# Patient Record
Sex: MALE | Race: WHITE | NOT HISPANIC OR LATINO | Employment: FULL TIME | URBAN - METROPOLITAN AREA
[De-identification: names, ages, dates, MRNs, and addresses within clinical notes are randomized per-mention and may not be internally consistent; named-entity substitution may affect disease eponyms.]

---

## 2018-09-20 ENCOUNTER — HISTORICAL (OUTPATIENT)
Dept: ADMINISTRATIVE | Facility: HOSPITAL | Age: 33
End: 2018-09-20

## 2019-02-14 ENCOUNTER — HISTORICAL (OUTPATIENT)
Dept: ADMINISTRATIVE | Facility: HOSPITAL | Age: 34
End: 2019-02-14

## 2019-02-14 LAB
ABS NEUT (OLG): 2.9 X10(3)/MCL (ref 2.1–9.2)
ALBUMIN SERPL-MCNC: 4.4 GM/DL (ref 3.4–5)
ALBUMIN/GLOB SERPL: 1.76 {RATIO} (ref 1.5–2.5)
ALP SERPL-CCNC: 52 UNIT/L (ref 38–126)
ALT SERPL-CCNC: 18 UNIT/L (ref 7–52)
AST SERPL-CCNC: 19 UNIT/L (ref 15–37)
BILIRUB SERPL-MCNC: 0.9 MG/DL (ref 0.2–1)
BILIRUBIN DIRECT+TOT PNL SERPL-MCNC: 0.2 MG/DL (ref 0–0.5)
BILIRUBIN DIRECT+TOT PNL SERPL-MCNC: 0.7 MG/DL
BUN SERPL-MCNC: 10 MG/DL (ref 7–18)
CALCIUM SERPL-MCNC: 8.9 MG/DL (ref 8.5–10)
CHLORIDE SERPL-SCNC: 102 MMOL/L (ref 98–107)
CHOLEST SERPL-MCNC: 193 MG/DL (ref 0–200)
CHOLEST/HDLC SERPL: 3.6 {RATIO}
CO2 SERPL-SCNC: 28 MMOL/L (ref 21–32)
CREAT SERPL-MCNC: 0.87 MG/DL (ref 0.6–1.3)
ERYTHROCYTE [DISTWIDTH] IN BLOOD BY AUTOMATED COUNT: 12.7 % (ref 11.5–17)
GLOBULIN SER-MCNC: 2.5 GM/DL (ref 1.2–3)
GLUCOSE SERPL-MCNC: 89 MG/DL (ref 74–106)
HCT VFR BLD AUTO: 47 % (ref 42–52)
HDLC SERPL-MCNC: 53 MG/DL (ref 35–60)
HGB BLD-MCNC: 14.9 GM/DL (ref 14–18)
LDLC SERPL CALC-MCNC: 109 MG/DL (ref 0–129)
LYMPHOCYTES # BLD AUTO: 2 X10(3)/MCL (ref 0.6–3.4)
LYMPHOCYTES NFR BLD AUTO: 35.8 % (ref 13–40)
MCH RBC QN AUTO: 30 PG (ref 27–31.2)
MCHC RBC AUTO-ENTMCNC: 32 GM/DL (ref 32–36)
MCV RBC AUTO: 95 FL (ref 80–94)
MONOCYTES # BLD AUTO: 0.6 X10(3)/MCL (ref 0.1–1.3)
MONOCYTES NFR BLD AUTO: 11 % (ref 0.1–24)
NEUTROPHILS NFR BLD AUTO: 53.2 % (ref 47–80)
PLATELET # BLD AUTO: 193 X10(3)/MCL (ref 130–400)
PMV BLD AUTO: 10.8 FL (ref 9.4–12.4)
POTASSIUM SERPL-SCNC: 3.9 MMOL/L (ref 3.5–5.1)
PROT SERPL-MCNC: 6.9 GM/DL (ref 6.4–8.2)
RBC # BLD AUTO: 4.97 X10(6)/MCL (ref 4.7–6.1)
SODIUM SERPL-SCNC: 137 MMOL/L (ref 136–145)
TRIGL SERPL-MCNC: 97 MG/DL (ref 30–150)
TSH SERPL-ACNC: 0.29 MIU/ML (ref 0.35–4.94)
VLDLC SERPL CALC-MCNC: 19.4 MG/DL
WBC # SPEC AUTO: 5.5 X10(3)/MCL (ref 4.5–11.5)

## 2019-02-19 ENCOUNTER — HISTORICAL (OUTPATIENT)
Dept: CARDIOLOGY | Facility: HOSPITAL | Age: 34
End: 2019-02-19

## 2019-05-03 ENCOUNTER — HISTORICAL (OUTPATIENT)
Dept: CARDIOLOGY | Facility: HOSPITAL | Age: 34
End: 2019-05-03

## 2020-03-05 ENCOUNTER — HISTORICAL (OUTPATIENT)
Dept: ADMINISTRATIVE | Facility: HOSPITAL | Age: 35
End: 2020-03-05

## 2020-03-05 LAB — POC CREATININE: 0.9 MG/DL (ref 0.6–1.3)

## 2021-08-02 ENCOUNTER — HOSPITAL ENCOUNTER (OUTPATIENT)
Dept: OCCUPATIONAL THERAPY | Facility: HOSPITAL | Age: 36
End: 2021-08-03
Attending: INTERNAL MEDICINE | Admitting: INTERNAL MEDICINE

## 2021-08-02 LAB
ABS NEUT (OLG): 2.06 X10(3)/MCL (ref 2.1–9.2)
ALBUMIN SERPL-MCNC: 4.5 GM/DL (ref 3.5–5)
ALBUMIN/GLOB SERPL: 1.2 RATIO (ref 1.1–2)
ALP SERPL-CCNC: 70 UNIT/L (ref 40–150)
ALT SERPL-CCNC: 19 UNIT/L (ref 0–55)
APTT PPP: 26.7 SEC (ref 23.4–34.9)
AST SERPL-CCNC: 22 UNIT/L (ref 5–34)
BASOPHILS NFR BLD MANUAL: 0 % (ref 0–2)
BILIRUB SERPL-MCNC: 1 MG/DL (ref 0.2–1.2)
BILIRUBIN DIRECT+TOT PNL SERPL-MCNC: 0.4 MG/DL (ref 0–0.5)
BILIRUBIN DIRECT+TOT PNL SERPL-MCNC: 0.6 MG/DL (ref 0–0.8)
BUN SERPL-MCNC: 6.7 MG/DL (ref 8.9–20.6)
CALCIUM SERPL-MCNC: 9.7 MG/DL (ref 8.4–10.2)
CHLORIDE SERPL-SCNC: 105 MMOL/L (ref 98–107)
CO2 SERPL-SCNC: 23 MMOL/L (ref 22–29)
CREAT SERPL-MCNC: 1.01 MG/DL (ref 0.72–1.25)
EOSINOPHIL NFR BLD MANUAL: 3 % (ref 0–8)
ERYTHROCYTE [DISTWIDTH] IN BLOOD BY AUTOMATED COUNT: 12.2 % (ref 11.5–17)
GLOBULIN SER-MCNC: 3.6 GM/DL (ref 2.4–3.5)
GLUCOSE SERPL-MCNC: 98 MG/DL (ref 74–100)
GRANULOCYTES NFR BLD MANUAL: 44 % (ref 47–80)
HCT VFR BLD AUTO: 45.9 % (ref 42–52)
HGB BLD-MCNC: 15.4 GM/DL (ref 14–18)
INR PPP: 1 (ref 2–3)
LYMPHOCYTES NFR BLD MANUAL: 3 %
LYMPHOCYTES NFR BLD MANUAL: 37 % (ref 13–40)
MCH RBC QN AUTO: 30.7 PG (ref 27–31)
MCHC RBC AUTO-ENTMCNC: 33.6 GM/DL (ref 33–36)
MCV RBC AUTO: 91.4 FL (ref 80–94)
MONOCYTES NFR BLD MANUAL: 13 % (ref 2–11)
PLATELET # BLD AUTO: 254 X10(3)/MCL (ref 130–400)
PLATELET # BLD EST: ADEQUATE 10*3/UL
PMV BLD AUTO: 11.3 FL (ref 7.4–10.4)
POTASSIUM SERPL-SCNC: 3.4 MMOL/L (ref 3.5–5.1)
PROT SERPL-MCNC: 8.1 GM/DL (ref 6.4–8.3)
PROTHROMBIN TIME: 13.2 SEC (ref 11.7–14.5)
RBC # BLD AUTO: 5.02 X10(6)/MCL (ref 4.7–6.1)
RBC MORPH BLD: NORMAL
SARS-COV-2 AG RESP QL IA.RAPID: NEGATIVE
SODIUM SERPL-SCNC: 140 MMOL/L (ref 136–145)
TROPONIN I SERPL-MCNC: <0.01 NG/ML (ref 0.01–0.03)
WBC # SPEC AUTO: 5.9 X10(3)/MCL (ref 4.5–11.5)

## 2021-08-03 LAB
ABS NEUT (OLG): 11.63 X10(3)/MCL (ref 2.1–9.2)
BASOPHILS # BLD AUTO: 0 X10(3)/MCL (ref 0–0.2)
BASOPHILS NFR BLD AUTO: 0 %
BUN SERPL-MCNC: 14 MG/DL (ref 8.9–20.6)
CALCIUM SERPL-MCNC: 9.1 MG/DL (ref 8.4–10.2)
CHLORIDE SERPL-SCNC: 106 MMOL/L (ref 98–107)
CO2 SERPL-SCNC: 22 MMOL/L (ref 22–29)
CREAT SERPL-MCNC: 0.79 MG/DL (ref 0.73–1.18)
CREAT/UREA NIT SERPL: 18
EOSINOPHIL # BLD AUTO: 0 X10(3)/MCL (ref 0–0.9)
EOSINOPHIL NFR BLD AUTO: 0 %
ERYTHROCYTE [DISTWIDTH] IN BLOOD BY AUTOMATED COUNT: 12.4 % (ref 11.5–17)
GLUCOSE SERPL-MCNC: 140 MG/DL (ref 74–100)
HCT VFR BLD AUTO: 41.5 % (ref 42–52)
HGB BLD-MCNC: 14.1 GM/DL (ref 14–18)
LYMPHOCYTES # BLD AUTO: 0.8 X10(3)/MCL (ref 0.6–4.6)
LYMPHOCYTES NFR BLD AUTO: 6 %
MCH RBC QN AUTO: 30.8 PG (ref 27–31)
MCHC RBC AUTO-ENTMCNC: 34 GM/DL (ref 33–36)
MCV RBC AUTO: 90.6 FL (ref 80–94)
MONOCYTES # BLD AUTO: 0.8 X10(3)/MCL (ref 0.1–1.3)
MONOCYTES NFR BLD AUTO: 6 %
NEUTROPHILS # BLD AUTO: 11.63 X10(3)/MCL (ref 2.1–9.2)
NEUTROPHILS NFR BLD AUTO: 87 %
PLATELET # BLD AUTO: 237 X10(3)/MCL (ref 130–400)
PMV BLD AUTO: 12.3 FL (ref 9.4–12.4)
POTASSIUM SERPL-SCNC: 4.4 MMOL/L (ref 3.5–5.1)
RBC # BLD AUTO: 4.58 X10(6)/MCL (ref 4.7–6.1)
SODIUM SERPL-SCNC: 137 MMOL/L (ref 136–145)
WBC # SPEC AUTO: 13.4 X10(3)/MCL (ref 4.5–11.5)

## 2022-04-29 NOTE — H&P
Patient:   Rich Nicholas             MRN: [id number]            FIN: [id number]               Age:   36 years     Sex:  Male     :  1985   Associated Diagnoses:   None   Author:   José Gutierrez MD      Basic Information   Source of history:  Self.    Referral source:  Emergency department.    History limitation:  None.    PCP: Dr. Wilburn      Chief Complaint   2021 5:30 CDT        hx of spontaneous pneumonthorax / now having chest pain l sided        History of Present Illness   This is a 35 y/o Male with a hx of ADHD and a remote hx of a spontaneous pneumothorax x2 in . This am he develop sharp, left side pleuritic pain that worsened over 45 mins. He presented to the ER for evaluation and was found to have a new left sided pneumothorax. He denies any recent trauma,  respiratory illnesses or surgical interventions. His pain is controlled at the time of my evaluation. He has no other c/o at the time of my evaluation.      Review of Systems   Except as documented, all other systems reviewed and negative.      Health Status   Allergies:    Allergic Reactions (Selected)  Severity Not Documented  Cephalosporins- Generalized anaphylaxis.  Influenza virus vaccine, inactivated- Unknown.  Morphine- Hives.  Tuberculin purified protein derivative- No reactions were documented.,    Allergies (4) Active Reaction  cephalosporins Generalized anaphylaxis  influenza virus vaccine, Unknown  inactivated  morphine Hives  tuberculin purified protein None Documented  derivative     Current medications:  (Selected)   Inpatient Medications  Ordered  morphine 4 mg/mL preservative-free intravenous solution: 4 mg, form: Injection, IV Push, q4hr PRN for pain, severe, Order duration: 3 day(s), first dose 21 9:43:00 CDT, stop date 21 9:42:00 CDT, STAT, ( > 7 on pain scale)  Pending Complete  Dilaudid 2 mg/mL injectable solution: 0.2 mg, form: Injection, IV Slow, q5min, Order duration: 5 dose(s), first dose  02/18/14 14:30:00 CST, stop date 02/18/14 14:54:00 CST, over at least 2 3 minutes  Prescriptions  Prescribed  Neurontin 300 mg oral capsule: 300 mg = 1 cap(s), Oral, TID, # 90 cap(s), 1 Refill(s), Pharmacy: [facility], 186, cm, Height/Length Dosing, 02/24/21 14:13:00 CST, 77.6, kg, Weight Dosing, 02/24/21 14:10:00 CST  ProAir HFA 90 mcg/inh inhalation aerosol with adapter: 90 mcg = 1 puff(s), INH, q4hr, PRN PRN as needed for wheezing, # 1 EA, 3 Refill(s), Pharmacy: [facility], 186, cm, Height/Length Dosing, 02/24/21 14:13:00 CST, 77.6, kg, Weight Dosing, 02/24/21 14:10:00 CST  Vyvanse 60 mg oral capsule: 60 mg = 1 cap(s), Oral, qAM, # 30 cap(s), 0 Refill(s)  albuterol 2.5 mg/3 mL (0.083%) inhalation solution: 2.5 mg = 3 mL, INH, q6hr, PRN PRN for wheezing, # 100 EA, 3 Refill(s), Pharmacy: [facility], 186, cm, Height/Length Dosing, 11/18/20 14:50:00 CST, 75.4, kg, Weight Dosing, 11/18/20 14:48:00 CST,    Medications (1) Active  Scheduled: (0)  Continuous: (0)  PRN: (1)  morphine 4 mg/mL preservative-free Soln  4 mg 1 mL, IV Push, q4hr     Problem list:    All Problems  Asthma / SNOMED CT 548655842 / Confirmed  Allergy to food / SNOMED CT 0009758540 / Confirmed  ADHD / SNOMED CT 6904995259 / Confirmed  TOS - Thoracic outlet syndrome / SNOMED CT 015565562 / Confirmed  Renal calculus / SNOMED CT 105865498 / Confirmed  Resolved: Impaired mobility / SNOMED CT 055418879  Problem automatically updated based on documentation on Assistive Device, ADLs, Activity Status ADLs, Musculoskeletal Range of Motion, Musculoskeletal Activity, Special Orthopedic Devices, and/or Traction Type.  Resolved: Alteration in patterns of urinary elimination / SNOMED CT 85594793  Problem automatically updated based on documentation on Genitourinary Symptoms and/or Urinary Elimination.  Resolved: Impaired skin integrity / SNOMED CT 91215730  Problem automatically updated based on documentation on Skin Abnormality Type and/or  Pressure Ulcer Present on Admisison.  Resolved: At risk for impaired skin integrity / SNOMED CT 840018163  Problem automatically updated based on documentation on Skin Abnormality Type, Pressure Ulcer Present on Admission, Oxygen Therapy, Mask/Delivery Type, Pressure Point, and/or a Ron Score of less than 16.  Resolved: At risk for infection / SNOMED CT 716669751  Problem automatically updated based on documentation on Surgical Drain Tube Type and/or Skin Abnormality Type.  Resolved: Pneumothorax / SNOMED CT [id number]  x2  Canceled: No Chronic Problems / Cerner NKP  Canceled: ADD - Attention deficit disorder / SNOMED CT 155970613,    Active Problems (5)  ADHD   Allergy to food   Asthma   Renal calculus   TOS - Thoracic outlet syndrome         Histories   Past Medical History:    Resolved  Pneumothorax ([id number]): Onset in 2000 at 15 years.  Resolved.  Comments:  2/26/2016 CST 13:21 CST - Peter RT, Nadia BARAHONA  x2, Right sided rib resection for thorac outlet obstruction in 2019   Family History:    Cancer  Grandfather  Grandmother  Emphysema  Grandfather  Grandmother  Hypertension.  Grandfather  Grandmother  Hyperlipidemia.  Father     Procedure history:    Rib Resection (Right) performed by Jose Adams IV, MD on 5/1/2019 at 34 Years.  Comments:  5/1/2019 13:31 CDT - [facility] LUCAS, COOKIE Martins  auto-populated from documented surgical case  Appendectomy Laparoscopic performed by Francine SWENSON, Juan José PEREZ on 2/18/2014 at 28 Years.  Comments:  2/18/2014 12:04 CST - Natanael LUCAS, Moira KEVIN  auto-populated from documented surgical case  Pleurodesis (SNOMED CT 705464092) in 2000 at 15 Years.  urtheroscope.   Social History        Social & Psychosocial Habits    Alcohol  08/20/2013 Risk Assessment: Denies Alcohol Use    03/27/2018  Use: Current    Type: Beer    Frequency: 1-2 times per year    Employment/School  03/27/2018  Status: Employed    Exercise  03/27/2018  Duration  (average number of minutes): 90    Times per week: 3-4 times/week    Exercise type: Running, Weight lifting    Home/Environment  03/27/2018  Lives with: Alone    Nutrition/Health  03/27/2018  Type of diet: Regular    Substance Use  03/27/2018  Use: Never    Tobacco  08/20/2013 Risk Assessment: Denies Tobacco Use    06/03/2021  Use: Never (less than 100 in l    Patient Wants Consult For Cessation Counseling N/A    08/02/2021  Use: Never (less than 100 in l    Patient Wants Consult For Cessation Counseling N/A    Abuse/Neglect  06/03/2021  SHX Any signs of abuse or neglect No    08/02/2021  SHX Any signs of abuse or neglect No  .        Physical Examination      Vital Signs (last 24 hrs)_____  Last Charted___________  Temp Oral     36.8 DegC  (AUG 02 05:30)  Heart Rate Peripheral   L 56bpm  (AUG 02 12:37)  Resp Rate         18 br/min  (AUG 02 12:37)  SBP      127 mmHg  (AUG 02 11:00)  DBP      79 mmHg  (AUG 02 11:00)  SpO2      100 %  (AUG 02 12:37)     General:  Alert and oriented, No acute distress.    Eye:  Extraocular movements are intact, Normal conjunctiva.    HENT:  Oral mucosa is moist.    Neck:  No jugular venous distention, No lymphadenopathy.    Respiratory:  Lungs are clear to auscultation, Respirations are non-labored, Breath sounds are equal, Symmetrical chest wall expansion, wearing NRB.    Cardiovascular:  Normal rate, Regular rhythm, No edema.    Gastrointestinal:  Soft, Non-tender, Non-distended, Normal bowel sounds.    Musculoskeletal:  Normal range of motion.    Integumentary:  Dry, Intact.    Neurologic:  Alert.         Orientation: Oriented X 4.    Psychiatric:  Appropriate mood & affect, Normal judgment.       Review / Management   Laboratory Results   Today's Lab Results : PowerNote Discrete Results   8/2/2021 6:00 CDT        Additional Findings           8/2/2021 6:31 CDT        Est Creat Clearance Ser   116.31 mL/min    8/2/2021 6:00 CDT        WBC                       5.9 x10(3)/mcL                              RBC                       5.02 x10(6)/mcL                             Hgb                       15.4 gm/dL                             Hct                       45.9 %                             Platelet                  254 x10(3)/mcL                             MCV                       91.4 fL                             MCH                       30.7 pg                             MCHC                      33.6 gm/dL                             RDW                       12.2 %                             MPV                       11.3 fL  HI                             Abs Neut                  2.06 x10(3)/mcL  LOW                             Segs Man                  44 %  LOW                             Lymph Man                 37 %                             Monocyte Man              13 %  HI                             Eos Man                   3 %                             Basophil Man              0 %                             Abn Lymph Man             3 %  NA                             Platelet Est              Adequate                             RBC Morph                 Normal                             PT                        13.2 sec                             INR                       1.0  LOW                             PTT                       26.7 sec                             Sodium Lvl                140 mmol/L                             Potassium Lvl             3.4 mmol/L  LOW                             Chloride                  105 mmol/L                             CO2                       23 mmol/L                             Calcium Lvl               9.7 mg/dL                             Glucose Lvl               98 mg/dL                             BUN                       6.7 mg/dL  LOW                             Creatinine                1.01 mg/dL                             eGFR-AA                   >60  NA                             eGFR-FAWN                   >60 mL/min/1.73 m2  NA                             Bili Total                1.0 mg/dL                             Bili Direct               0.4 mg/dL                             Bili Indirect             0.60 mg/dL                             AST                       22 unit/L                             ALT                       19 unit/L                             Alk Phos                  70 unit/L                             Total Protein             8.1 gm/dL                             Albumin Lvl               4.5 gm/dL                             Globulin                  3.6 gm/dL  HI                             A/G Ratio                 1.2 ratio                             Troponin-I                <0.01 ng/mL        Radiology results   Rad Results (ST)   Accession: [id number]  Order: XR Chest 1 View  Report Dt/Tm: 08/02/2021 10:11  Report:   one view of the chest     CPT 66061     HISTORY:  Pneumothorax, spontaneous; follow up     FINDINGS:  Examination reveals cardiomediastinal silhouette to be unchanged as  compared with the previous exam. Small left apical pneumothorax  identified slightly increased as compared with the previous  examination of 5:44 AM with an apex to pleural reflection of  approximately 3 cm.     No other interval change     IMPRESSION: Minimal increase in size of left-sided pneumothorax now  with a portable to pleural reflection of approximately 3 cm    Accession: [id number]  Order: XR Chest 1 View  Report Dt/Tm: 08/02/2021 06:49  Report:   EXAM: XR Chest 1 View  DATE: 8/2/2021 6:00 AM CDT  INDICATION: Left-sided chest pain, history of pneumothorax.     COMPARISON: Chest radiograph 10/18/2019.     TECHNIQUE: Frontal views of the chest.        FINDINGS:   The cardiomediastinal silhouette is stable in size and contour.  Pulmonary vascularity is within normal limits. No focal consolidative  airspace disease or pleural effusion. No right pneumothorax. There  is  small left apical pneumothorax with maximal air gap of approximately  2.2 cm.     The imaged osseous structures and soft tissues are without acute  abnormality.        IMPRESSION:  Small volume left apical pneumothorax.        Critical findings conveyed to Dr. Hardwick of the [facility] emergency  department at 0650 hours on 8/2/2021.            Impression and Plan     Left apical pneumothorax  Hx of ADHD    Plan  place in observation  consult pulmonary to eval patient  cont oxygen     Patient care time greater than 30 minutes

## 2022-04-29 NOTE — DISCHARGE SUMMARY
Patient:   Rich Nicholas             MRN: [id number]            FIN: [id number]               Age:   36 years     Sex:  Male     :  1985   Associated Diagnoses:   None   Author:   Ross Eddy MD      Discharge Information      Discharge Summary Information   Admit/Discharge Dates   Admit Date: 2021  Discharge Date: 2021     Physicians   Attending Physician - Surjit SWENSON, Ross  Admitting Physician - Surjit SWENSON, Ross  Consulting Physician - FABRICIO Dowd MD  Primary Care Physician - Berlin Wilburn MD     Discharge Diagnosis   Left apical pneumothorax  Hx of ADHD   Procedures   No procedures recorded for this visit.   Immunizations   No immunizations recorded for this visit.     Discharge Medications   Prescribed  traMADol (Ultram 50 mg oral tablet) 50 mg, Oral, q4hr, PRN pain  Continue  albuterol (ProAir HFA 90 mcg/inh inhalation aerosol with adapter) 90 mcg, INH, q4hr, PRN as needed for wheezing  gabapentin (Neurontin 300 mg oral capsule) 300 mg, Oral, TID  lisdexamfetamine (Vyvanse 60 mg oral capsule) 60 mg, Oral, qAM  Discontinue  albuterol (albuterol 2.5 mg/3 mL (0.083%) inhalation solution) 2.5 mg, INH, q6hr, PRN for wheezing        Education   Discharge - 21 7:58:00 CDT, Home, Give all scheduled vaccinations prior to discharge.   Discharge Activity - Activity as Tolerated   Discharge Diet - Regular         Followup   F/Uin pulmonary clinic as scheduled        Hospital Course   Hospital Course   This is a 37 y/o Male with a hx of ADHD and a remote hx of a spontaneous pneumothorax x2 in . This am he develop sharp, left side pleuritic pain that worsened over 45 mins. He presented to the ER for evaluation and was found to have a new left sided pneumothorax. He denies any recent trauma,  respiratory illnesses or surgical interventions. His pain is controlled at the time of my evaluation. He denied any fever, chills, cough or palpitation. He was  started on supplemental oxygen and po/iv prn pain meds. He was seen by pulmonary team. CXR was reviewed and since his pneumo was small he was cleared to go home and f/u in their clinic in 1 week. Patients labs and vitals was stable. Discharged to home in a stable condition.  Explained in detail to patient about the discharge plan, medications and F/U visits. He understands and agree with the treatment plan.   Condition stable  Diet: Regular   Meds per dc med rec  Activities as tolerated   F/U with PCP in 1 to 2 weeks  For further questions contact hospitalist office  MT 31 mts .        Physical Examination   General:  Alert and oriented, No acute distress.    Respiratory:  Lungs are clear to auscultation, Breath sounds are equal.    Cardiovascular:  Normal rate, Regular rhythm, No murmur.    Gastrointestinal:  Soft, Non-tender, Non-distended, Normal bowel sounds.    Neurologic:  No focal deficits, Cranial Nerves II-XII are grossly intact.       Discharge Plan   Education and Follow-up   Counseled: patient, regarding diagnosis, regarding treatment, regarding medications.

## 2022-05-25 PROBLEM — N20.0 CALCULUS OF KIDNEY: Status: ACTIVE | Noted: 2022-05-25

## 2022-05-25 PROBLEM — J93.9 PNEUMOTHORAX: Status: ACTIVE | Noted: 2022-05-25

## 2022-05-25 PROBLEM — J45.909 ASTHMA: Status: ACTIVE | Noted: 2022-05-25

## 2022-05-25 PROBLEM — F90.9 ATTENTION DEFICIT HYPERACTIVITY DISORDER (ADHD): Status: ACTIVE | Noted: 2022-05-25

## 2022-05-25 PROBLEM — G54.0 THORACIC OUTLET SYNDROME: Status: ACTIVE | Noted: 2022-05-25

## 2022-07-07 PROBLEM — Z00.00 ANNUAL PHYSICAL EXAM: Status: ACTIVE | Noted: 2022-07-07

## 2022-07-07 PROBLEM — F98.8 ATTENTION DEFICIT DISORDER: Status: ACTIVE | Noted: 2022-05-25

## 2022-10-10 PROBLEM — Z00.00 ANNUAL PHYSICAL EXAM: Status: RESOLVED | Noted: 2022-07-07 | Resolved: 2022-10-10

## 2022-11-23 ENCOUNTER — HOSPITAL ENCOUNTER (EMERGENCY)
Facility: HOSPITAL | Age: 37
Discharge: HOME | End: 2022-11-23
Attending: EMERGENCY MEDICINE
Payer: COMMERCIAL

## 2022-11-23 VITALS
RESPIRATION RATE: 20 BRPM | HEART RATE: 87 BPM | OXYGEN SATURATION: 97 % | WEIGHT: 170 LBS | SYSTOLIC BLOOD PRESSURE: 118 MMHG | BODY MASS INDEX: 21.82 KG/M2 | DIASTOLIC BLOOD PRESSURE: 80 MMHG | TEMPERATURE: 99 F | HEIGHT: 74 IN

## 2022-11-23 DIAGNOSIS — J02.9 PHARYNGITIS, UNSPECIFIED ETIOLOGY: Primary | ICD-10-CM

## 2022-11-23 LAB
ABS NEUT CALC (OHS): 6.63 X10(3)/MCL (ref 2.1–9.2)
ALBUMIN SERPL-MCNC: 3.2 GM/DL (ref 3.5–5)
ALBUMIN/GLOB SERPL: 0.8 RATIO (ref 1.1–2)
ALP SERPL-CCNC: 69 UNIT/L (ref 40–150)
ALT SERPL-CCNC: 20 UNIT/L (ref 0–55)
AST SERPL-CCNC: 18 UNIT/L (ref 5–34)
BILIRUBIN DIRECT+TOT PNL SERPL-MCNC: 0.5 MG/DL
BUN SERPL-MCNC: 11.2 MG/DL (ref 8.9–20.6)
CALCIUM SERPL-MCNC: 8.8 MG/DL (ref 8.4–10.2)
CHLORIDE SERPL-SCNC: 108 MMOL/L (ref 98–107)
CO2 SERPL-SCNC: 23 MMOL/L (ref 22–29)
CREAT SERPL-MCNC: 0.8 MG/DL (ref 0.73–1.18)
EOSINOPHIL NFR BLD MANUAL: 0.2 X10(3)/MCL (ref 0–0.9)
EOSINOPHIL NFR BLD MANUAL: 2 % (ref 0–8)
ERYTHROCYTE [DISTWIDTH] IN BLOOD BY AUTOMATED COUNT: 13 % (ref 11.5–17)
FLUAV AG UPPER RESP QL IA.RAPID: NOT DETECTED
FLUBV AG UPPER RESP QL IA.RAPID: NOT DETECTED
GFR SERPLBLD CREATININE-BSD FMLA CKD-EPI: >60 MLS/MIN/1.73/M2
GLOBULIN SER-MCNC: 4.1 GM/DL (ref 2.4–3.5)
GLUCOSE SERPL-MCNC: 91 MG/DL (ref 74–100)
HCT VFR BLD AUTO: 41 % (ref 42–52)
HGB BLD-MCNC: 13.6 GM/DL (ref 14–18)
IMM GRANULOCYTES # BLD AUTO: 0.02 X10(3)/MCL (ref 0–0.04)
IMM GRANULOCYTES NFR BLD AUTO: 0.2 %
LYMPHOCYTES NFR BLD MANUAL: 1.53 X10(3)/MCL
LYMPHOCYTES NFR BLD MANUAL: 15 % (ref 13–40)
MCH RBC QN AUTO: 30 PG (ref 27–31)
MCHC RBC AUTO-ENTMCNC: 33.2 MG/DL (ref 33–36)
MCV RBC AUTO: 90.5 FL (ref 80–94)
MONOCYTES NFR BLD MANUAL: 1.84 X10(3)/MCL (ref 0.1–1.3)
MONOCYTES NFR BLD MANUAL: 18 % (ref 2–11)
NEUTROPHILS NFR BLD MANUAL: 65 % (ref 47–80)
NRBC BLD AUTO-RTO: 0 %
PLATELET # BLD AUTO: 217 X10(3)/MCL (ref 130–400)
PLATELET # BLD EST: ADEQUATE 10*3/UL
PMV BLD AUTO: 11.1 FL (ref 7.4–10.4)
POTASSIUM SERPL-SCNC: 3.4 MMOL/L (ref 3.5–5.1)
PROT SERPL-MCNC: 7.3 GM/DL (ref 6.4–8.3)
RBC # BLD AUTO: 4.53 X10(6)/MCL (ref 4.7–6.1)
RBC MORPH BLD: NORMAL
RSV A 5' UTR RNA NPH QL NAA+PROBE: NOT DETECTED
SARS-COV-2 RNA RESP QL NAA+PROBE: NOT DETECTED
SODIUM SERPL-SCNC: 139 MMOL/L (ref 136–145)
STREP A PCR (OHS): NOT DETECTED
WBC # SPEC AUTO: 10.2 X10(3)/MCL (ref 4.5–11.5)

## 2022-11-23 PROCEDURE — 25000003 PHARM REV CODE 250: Performed by: EMERGENCY MEDICINE

## 2022-11-23 PROCEDURE — 96374 THER/PROPH/DIAG INJ IV PUSH: CPT

## 2022-11-23 PROCEDURE — 96361 HYDRATE IV INFUSION ADD-ON: CPT

## 2022-11-23 PROCEDURE — 85025 COMPLETE CBC W/AUTO DIFF WBC: CPT | Performed by: EMERGENCY MEDICINE

## 2022-11-23 PROCEDURE — 0241U COVID/RSV/FLU A&B PCR: CPT | Performed by: EMERGENCY MEDICINE

## 2022-11-23 PROCEDURE — 96375 TX/PRO/DX INJ NEW DRUG ADDON: CPT

## 2022-11-23 PROCEDURE — 85007 BL SMEAR W/DIFF WBC COUNT: CPT | Performed by: EMERGENCY MEDICINE

## 2022-11-23 PROCEDURE — 99285 EMERGENCY DEPT VISIT HI MDM: CPT | Mod: 25

## 2022-11-23 PROCEDURE — 25500020 PHARM REV CODE 255: Performed by: EMERGENCY MEDICINE

## 2022-11-23 PROCEDURE — 87651 STREP A DNA AMP PROBE: CPT | Performed by: EMERGENCY MEDICINE

## 2022-11-23 PROCEDURE — 63600175 PHARM REV CODE 636 W HCPCS: Performed by: EMERGENCY MEDICINE

## 2022-11-23 PROCEDURE — 80053 COMPREHEN METABOLIC PANEL: CPT | Performed by: EMERGENCY MEDICINE

## 2022-11-23 RX ORDER — KETOROLAC TROMETHAMINE 30 MG/ML
30 INJECTION, SOLUTION INTRAMUSCULAR; INTRAVENOUS
Status: COMPLETED | OUTPATIENT
Start: 2022-11-23 | End: 2022-11-23

## 2022-11-23 RX ORDER — KETOROLAC TROMETHAMINE 10 MG/1
10 TABLET, FILM COATED ORAL EVERY 6 HOURS
Qty: 20 TABLET | Refills: 0 | Status: SHIPPED | OUTPATIENT
Start: 2022-11-23 | End: 2022-11-28

## 2022-11-23 RX ORDER — DEXAMETHASONE SODIUM PHOSPHATE 4 MG/ML
8 INJECTION, SOLUTION INTRA-ARTICULAR; INTRALESIONAL; INTRAMUSCULAR; INTRAVENOUS; SOFT TISSUE
Status: COMPLETED | OUTPATIENT
Start: 2022-11-23 | End: 2022-11-23

## 2022-11-23 RX ORDER — SODIUM CHLORIDE 9 MG/ML
1000 INJECTION, SOLUTION INTRAVENOUS
Status: COMPLETED | OUTPATIENT
Start: 2022-11-23 | End: 2022-11-23

## 2022-11-23 RX ORDER — AZITHROMYCIN 250 MG/1
500 TABLET, FILM COATED ORAL DAILY
Qty: 6 TABLET | Refills: 0 | Status: SHIPPED | OUTPATIENT
Start: 2022-11-23 | End: 2023-01-04

## 2022-11-23 RX ADMIN — DEXAMETHASONE SODIUM PHOSPHATE 8 MG: 4 INJECTION, SOLUTION INTRA-ARTICULAR; INTRALESIONAL; INTRAMUSCULAR; INTRAVENOUS; SOFT TISSUE at 09:11

## 2022-11-23 RX ADMIN — SODIUM CHLORIDE 1000 ML: 9 INJECTION, SOLUTION INTRAVENOUS at 09:11

## 2022-11-23 RX ADMIN — IOPAMIDOL 100 ML: 755 INJECTION, SOLUTION INTRAVENOUS at 11:11

## 2022-11-23 RX ADMIN — KETOROLAC TROMETHAMINE 30 MG: 30 INJECTION, SOLUTION INTRAMUSCULAR at 09:11

## 2022-11-23 NOTE — ED TRIAGE NOTES
Pt complaint of worsening and persistant sore throat expressing +strep 5 weeks ago and then recent dental work for crown repait. Pt states thathe feels really bad and concerned may have a tonsilar abscess

## 2022-11-23 NOTE — ED PROVIDER NOTES
Encounter Date: 11/23/2022       History     Chief Complaint   Patient presents with    Sore Throat     Pt complaint of worsening and persistant sore throat expressing +strep 5 weeks ago and then recent dental work for crown repait. Pt states thathe feels really bad and concerned may have a tonsilar abscess     Patient is a 38 yo M presenting with recurrent sore throat. He was diagnosed with strep approx 5 weeks ago, completed a course of amoxicillin, and improved. Then 4 days ago, throat became sore again, getting worse more rapidly this time and now feels like he is have difficulty swallowing, having some shortness of breath, and difficulty managing secretions. He's having chills and subjective fevers. No vomiting, diarrhea, rashes, leg swelling or other complaints. 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 Pain is best localized to the left side of the throat with radiation up to his ear. He has some lightheadedness when he stands too quickly.       Review of patient's allergies indicates:   Allergen Reactions    Cephalosporins Anaphylaxis    Covid-19 (sars-cov-2) vaccine, vlp Anaphylaxis     2ND COVID PFIZER VACCINE 1/12/2021 CAUSED ANAPHYLAXIS    Flu vaccine 2011-12(3 yr+)(pf)      Other reaction(s): Unknown    Morphine      Other reaction(s): Hives    Tuberculin, purified protein derivative      Past Medical History:   Diagnosis Date    History of pneumothorax      Past Surgical History:   Procedure Laterality Date    APPENDECTOMY      BONE RESECTION, RIB  05/01/2019    EYE FOREIGN BODY REMOVAL Left 03/05/2019    PLEURODESIS USING TALC  2000     Family History   Problem Relation Age of Onset    Hyperlipidemia Father     Hypertension Maternal Grandmother     Cancer Maternal Grandmother     Hypertension Maternal Grandfather     Cancer Maternal Grandfather      Social History     Tobacco Use    Smoking status: Never    Smokeless tobacco: Current     Types: Chew   Substance Use Topics     Drug use: Never     Review of Systems   Constitutional:  Positive for chills, fatigue and fever.   HENT:  Positive for ear pain and sore throat.    Respiratory:  Negative for shortness of breath.    Cardiovascular:  Negative for chest pain.   Gastrointestinal:  Negative for nausea.   Genitourinary:  Negative for dysuria.   Musculoskeletal:  Negative for back pain.   Skin:  Negative for rash.   Neurological:  Negative for weakness.   Hematological:  Does not bruise/bleed easily.     Physical Exam     Initial Vitals [11/23/22 0838]   BP Pulse Resp Temp SpO2   118/80 87 20 98.6 °F (37 °C) 97 %      MAP       --         Physical Exam    Nursing note and vitals reviewed.  Constitutional: He appears well-developed and well-nourished. He is not diaphoretic. No distress.   HENT:   Head: Normocephalic and atraumatic.   Right Ear: External ear normal.   Left Ear: External ear normal.   Mouth/Throat: Oropharyngeal exudate present.   No uvula asymmetry or peritonsillar fullness to indicate peritonsillar abscess at this time. There id diffuse moderate to severe erythema of the posterior oropharynx   Eyes: Conjunctivae are normal.   Neck: Neck supple.   Cardiovascular:  Normal rate, regular rhythm and normal heart sounds.           Pulmonary/Chest: Breath sounds normal. No respiratory distress. He has no wheezes. He has no rhonchi.   Abdominal: Abdomen is soft. Bowel sounds are normal. He exhibits no distension. There is no abdominal tenderness. There is no rebound and no guarding.   Musculoskeletal:         General: No edema. Normal range of motion.      Cervical back: Neck supple.     Neurological: He is alert and oriented to person, place, and time.   Skin: Skin is warm and dry.   Psychiatric: He has a normal mood and affect. Thought content normal.       ED Course   Procedures  Labs Reviewed   COMPREHENSIVE METABOLIC PANEL - Abnormal; Notable for the following components:       Result Value    Potassium Level 3.4 (*)      Chloride 108 (*)     Albumin Level 3.2 (*)     Globulin 4.1 (*)     Albumin/Globulin Ratio 0.8 (*)     All other components within normal limits   CBC WITH DIFFERENTIAL - Abnormal; Notable for the following components:    RBC 4.53 (*)     Hgb 13.6 (*)     Hct 41.0 (*)     MPV 11.1 (*)     All other components within normal limits   MANUAL DIFFERENTIAL - Abnormal; Notable for the following components:    Monocyte Man 18 (*)     Abs Mono 1.836 (*)     All other components within normal limits   STREP GROUP A BY PCR - Normal    Narrative:     The Xpert Xpress Strep A test is a rapid, qualitative in vitro diagnostic test for the detection of Streptococcus pyogenes (Group A ß-hemolytic Streptococcus, Strep A) in throat swab specimens from patients with signs and symptoms of pharyngitis.     COVID/RSV/FLU A&B PCR - Normal    Narrative:     The Xpert Xpress SARS-CoV-2/FLU/RSV plus is a rapid, multiplexed real-time PCR test intended for the simultaneous qualitative detection and differentiation of SARS-CoV-2, Influenza A, Influenza B, and respiratory syncytial virus (RSV) viral RNA in either nasopharyngeal swab or nasal swab specimens.         CBC W/ AUTO DIFFERENTIAL    Narrative:     The following orders were created for panel order CBC auto differential.  Procedure                               Abnormality         Status                     ---------                               -----------         ------                     CBC with Differential[[id number]]        Abnormal            Final result               Manual Differential[[id number]]          Abnormal            Final result                 Please view results for these tests on the individual orders.          Imaging Results              CT Soft Tissue Neck With Contrast (Final result)  Result time 11/23/22 11:39:38      Final result by Remigio Valdez MD (11/23/22 11:39:38)                   Impression:      Findings seen consistent with bilateral  tonsillitis but no evidence of peritonsillar abscess seen      Electronically signed by: Remigio Valdez  Date:    2022  Time:    11:39               Narrative:    EXAMINATION:  CT SOFT TISSUE NECK WITH CONTRAST    CLINICAL HISTORY:  Tonsil/adenoid disorder;    TECHNIQUE:  Low dose axial images as well as sagittal and coronal reconstructions were performed from the skull base to the clavicles following the intravenous administration of contrast.  Automatic exposure control (AEC) is utilized to reduce patient radiation exposure.    COMPARISON:  2019    FINDINGS:  The visualized portion the paranasal sinuses shows a mucous retention cyst in the left maxillary sinus.  The orbits appear grossly unremarkable.  The parotid glands appear normal.  Submandibular glands appear normal.  There is enlargement of the tonsils bilaterally and there is some heterogeneous enhancement seen but no focal abscess is seen.  There is some subcentimeter lymphadenopathy seen in the retromandibular region most likely reactive.  No other mass or lesion is seen.  The larynx appears normal.  Trachea appears normal and midline.  The epiglottis appears normal.  Subglottic region appears normal.  No retropharyngeal abscess is seen.                                       Medications   ketorolac injection 30 mg (30 mg Intravenous Given 22 0903)   0.9%  NaCl infusion (0 mLs Intravenous Stopped 22 1035)   dexAMETHasone injection 8 mg (8 mg Intravenous Given 22 0903)   iopamidoL (ISOVUE-370) injection 100 mL (100 mLs Intravenous Given 22 1115)                              Clinical Impression:   Final diagnoses:  [J02.9] Pharyngitis, unspecified etiology (Primary)      ED Disposition Condition    Discharge Stable          ED Prescriptions       Medication Sig Dispense Start Date End Date Auth. Provider    ketorolac (TORADOL) 10 mg tablet () Take 1 tablet (10 mg total) by mouth every 6 (six) hours. for 5 days  20 tablet 11/23/2022 11/28/2022 Lor Reddy MD    azithromycin (ZITHROMAX Z-KAYLIE) 250 MG tablet Take 2 tablets (500 mg total) by mouth once daily. 6 tablet 11/23/2022 -- Lor Reddy MD          Follow-up Information       Follow up With Specialties Details Why Contact Info    Berlin Wilburn MD Family Medicine  As needed, If symptoms worsen, return to the ED. You can hold the azithromycin script and only start if symptoms worsen. [address] [facility] [address]  [phone]               Lor Reddy MD  12/02/22 0901

## 2023-10-16 ENCOUNTER — LAB VISIT (OUTPATIENT)
Dept: LAB | Facility: HOSPITAL | Age: 38
End: 2023-10-16
Attending: UROLOGY
Payer: COMMERCIAL

## 2023-10-16 DIAGNOSIS — Z01.818 OTHER SPECIFIED PRE-OPERATIVE EXAMINATION: Primary | ICD-10-CM

## 2023-10-16 DIAGNOSIS — Z01.818 OTHER SPECIFIED PRE-OPERATIVE EXAMINATION: ICD-10-CM

## 2023-10-16 LAB
ALBUMIN SERPL-MCNC: 4.2 G/DL (ref 3.5–5)
ALBUMIN/GLOB SERPL: 1.3 RATIO (ref 1.1–2)
ALP SERPL-CCNC: 61 UNIT/L (ref 40–150)
ALT SERPL-CCNC: 18 UNIT/L (ref 0–55)
AST SERPL-CCNC: 20 UNIT/L (ref 5–34)
BASOPHILS # BLD AUTO: 0.07 X10(3)/MCL
BASOPHILS NFR BLD AUTO: 1.6 %
BILIRUB SERPL-MCNC: 0.4 MG/DL
BUN SERPL-MCNC: 8.3 MG/DL (ref 8.9–20.6)
CALCIUM SERPL-MCNC: 9.6 MG/DL (ref 8.4–10.2)
CHLORIDE SERPL-SCNC: 106 MMOL/L (ref 98–107)
CO2 SERPL-SCNC: 29 MMOL/L (ref 22–29)
CREAT SERPL-MCNC: 0.84 MG/DL (ref 0.73–1.18)
EOSINOPHIL # BLD AUTO: 0.27 X10(3)/MCL (ref 0–0.9)
EOSINOPHIL NFR BLD AUTO: 6.1 %
ERYTHROCYTE [DISTWIDTH] IN BLOOD BY AUTOMATED COUNT: 12.7 % (ref 11.5–17)
GFR SERPLBLD CREATININE-BSD FMLA CKD-EPI: >60 MLS/MIN/1.73/M2
GLOBULIN SER-MCNC: 3.3 GM/DL (ref 2.4–3.5)
GLUCOSE SERPL-MCNC: 97 MG/DL (ref 74–100)
HCT VFR BLD AUTO: 49.7 % (ref 42–52)
HGB BLD-MCNC: 16.2 G/DL (ref 14–18)
IMM GRANULOCYTES # BLD AUTO: 0.01 X10(3)/MCL (ref 0–0.04)
IMM GRANULOCYTES NFR BLD AUTO: 0.2 %
LYMPHOCYTES # BLD AUTO: 1.66 X10(3)/MCL (ref 0.6–4.6)
LYMPHOCYTES NFR BLD AUTO: 37.4 %
MCH RBC QN AUTO: 30.3 PG (ref 27–31)
MCHC RBC AUTO-ENTMCNC: 32.6 G/DL (ref 33–36)
MCV RBC AUTO: 92.9 FL (ref 80–94)
MONOCYTES # BLD AUTO: 0.54 X10(3)/MCL (ref 0.1–1.3)
MONOCYTES NFR BLD AUTO: 12.2 %
NEUTROPHILS # BLD AUTO: 1.89 X10(3)/MCL (ref 2.1–9.2)
NEUTROPHILS NFR BLD AUTO: 42.5 %
NRBC BLD AUTO-RTO: 0 %
PLATELET # BLD AUTO: 229 X10(3)/MCL (ref 130–400)
PMV BLD AUTO: 12.1 FL (ref 7.4–10.4)
POTASSIUM SERPL-SCNC: 4.4 MMOL/L (ref 3.5–5.1)
PROT SERPL-MCNC: 7.5 GM/DL (ref 6.4–8.3)
RBC # BLD AUTO: 5.35 X10(6)/MCL (ref 4.7–6.1)
SODIUM SERPL-SCNC: 141 MMOL/L (ref 136–145)
WBC # SPEC AUTO: 4.44 X10(3)/MCL (ref 4.5–11.5)

## 2023-10-16 PROCEDURE — 93005 ELECTROCARDIOGRAM TRACING: CPT

## 2023-10-16 PROCEDURE — 36415 COLL VENOUS BLD VENIPUNCTURE: CPT

## 2023-10-16 PROCEDURE — 93010 EKG 12-LEAD: ICD-10-PCS | Mod: ,,, | Performed by: STUDENT IN AN ORGANIZED HEALTH CARE EDUCATION/TRAINING PROGRAM

## 2023-10-16 PROCEDURE — 93010 ELECTROCARDIOGRAM REPORT: CPT | Mod: ,,, | Performed by: STUDENT IN AN ORGANIZED HEALTH CARE EDUCATION/TRAINING PROGRAM

## 2023-10-18 ENCOUNTER — ANESTHESIA EVENT (OUTPATIENT)
Dept: SURGERY | Facility: HOSPITAL | Age: 38
End: 2023-10-18
Payer: COMMERCIAL

## 2023-10-18 NOTE — ANESTHESIA PREPROCEDURE EVALUATION
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     I have reviewed the Nursing Notes. I have reviewed the NPO Status.   I have reviewed the Medications.     Review of Systems  Anesthesia Hx:  No problems with previous Anesthesia  Denies Family Hx of Anesthesia complications.   Denies Personal Hx of Anesthesia complications.   Social:  Non-Smoker    Pulmonary:   Asthma H/O Spontaneous Pneumothorax x 3, even after pleurodesis sx   Renal/:   renal calculi Neurogenic Bladder, post op thoracotomy   Endocrine:  Endocrine Normal    Psych:   ADD         Physical Exam  General: Alert and Oriented    Airway:  Mallampati: I   Mouth Opening: Normal  TM Distance: Normal  Tongue: Normal  Neck ROM: Normal ROM    Dental:  Intact    Chest/Lungs:  Normal Respiratory Rate    Heart:  Rate: Normal  Rhythm: Regular Rhythm        Anesthesia Plan  Type of Anesthesia, risks & benefits discussed:    Anesthesia Type: Gen ETT  Intra-op Monitoring Plan: Standard ASA Monitors  Post Op Pain Control Plan: IV/PO Opioids PRN and multimodal analgesia  Induction:  IV  Airway Plan: Direct, Post-Induction  Informed Consent: Informed consent signed with the Patient and all parties understand the risks and agree with anesthesia plan.  All questions answered. Patient consented to blood products? Yes  ASA Score: 2  Day of Surgery Review of History & Physical: H&P Update referred to  the surgeon/provider.  Anesthesia Plan Notes: ERAS    Ready For Surgery From Anesthesia Perspective.     .

## 2023-10-19 ENCOUNTER — HOSPITAL ENCOUNTER (OUTPATIENT)
Facility: HOSPITAL | Age: 38
Discharge: HOME | End: 2023-10-19
Attending: UROLOGY | Admitting: UROLOGY
Payer: COMMERCIAL

## 2023-10-19 ENCOUNTER — ANESTHESIA (OUTPATIENT)
Dept: SURGERY | Facility: HOSPITAL | Age: 38
End: 2023-10-19
Payer: COMMERCIAL

## 2023-10-19 PROBLEM — Q55.22 RETRACTILE TESTIS: Status: RESOLVED | Noted: 2023-10-19 | Resolved: 2023-10-19

## 2023-10-19 PROBLEM — Q55.22 RETRACTILE TESTIS: Status: ACTIVE | Noted: 2023-10-19

## 2023-10-19 PROCEDURE — 37000009 HC ANESTHESIA EA ADD 15 MINS: Performed by: UROLOGY

## 2023-10-19 PROCEDURE — 63600175 PHARM REV CODE 636 W HCPCS: Performed by: UROLOGY

## 2023-10-19 PROCEDURE — 71000033 HC RECOVERY, INTIAL HOUR: Performed by: UROLOGY

## 2023-10-19 PROCEDURE — 25000003 PHARM REV CODE 250: Performed by: ANESTHESIOLOGY

## 2023-10-19 PROCEDURE — D9220A PRA ANESTHESIA: Mod: ANES,,, | Performed by: ANESTHESIOLOGY

## 2023-10-19 PROCEDURE — 71000016 HC POSTOP RECOV ADDL HR: Performed by: UROLOGY

## 2023-10-19 PROCEDURE — 36000706: Performed by: UROLOGY

## 2023-10-19 PROCEDURE — 36000707: Performed by: UROLOGY

## 2023-10-19 PROCEDURE — 63600175 PHARM REV CODE 636 W HCPCS: Performed by: ANESTHESIOLOGY

## 2023-10-19 PROCEDURE — 37000008 HC ANESTHESIA 1ST 15 MINUTES: Performed by: UROLOGY

## 2023-10-19 PROCEDURE — D9220A PRA ANESTHESIA: ICD-10-PCS | Mod: ANES,,, | Performed by: ANESTHESIOLOGY

## 2023-10-19 PROCEDURE — D9220A PRA ANESTHESIA: Mod: CRNA,,, | Performed by: NURSE ANESTHETIST, CERTIFIED REGISTERED

## 2023-10-19 PROCEDURE — 63600175 PHARM REV CODE 636 W HCPCS: Performed by: NURSE ANESTHETIST, CERTIFIED REGISTERED

## 2023-10-19 PROCEDURE — 25000003 PHARM REV CODE 250: Performed by: NURSE ANESTHETIST, CERTIFIED REGISTERED

## 2023-10-19 PROCEDURE — 71000015 HC POSTOP RECOV 1ST HR: Performed by: UROLOGY

## 2023-10-19 PROCEDURE — D9220A PRA ANESTHESIA: ICD-10-PCS | Mod: CRNA,,, | Performed by: NURSE ANESTHETIST, CERTIFIED REGISTERED

## 2023-10-19 PROCEDURE — 25000003 PHARM REV CODE 250: Performed by: UROLOGY

## 2023-10-19 RX ORDER — LIDOCAINE HYDROCHLORIDE 10 MG/ML
INJECTION, SOLUTION EPIDURAL; INFILTRATION; INTRACAUDAL; PERINEURAL
Status: DISCONTINUED | OUTPATIENT
Start: 2023-10-19 | End: 2023-10-19

## 2023-10-19 RX ORDER — FENTANYL CITRATE 50 UG/ML
INJECTION, SOLUTION INTRAMUSCULAR; INTRAVENOUS
Status: DISCONTINUED | OUTPATIENT
Start: 2023-10-19 | End: 2023-10-19

## 2023-10-19 RX ORDER — MIDAZOLAM HYDROCHLORIDE 1 MG/ML
2 INJECTION INTRAMUSCULAR; INTRAVENOUS ONCE AS NEEDED
Status: COMPLETED | OUTPATIENT
Start: 2023-10-19 | End: 2023-10-19

## 2023-10-19 RX ORDER — HYDROMORPHONE HYDROCHLORIDE 2 MG/ML
0.4 INJECTION, SOLUTION INTRAMUSCULAR; INTRAVENOUS; SUBCUTANEOUS EVERY 5 MIN PRN
Status: DISPENSED | OUTPATIENT
Start: 2023-10-19

## 2023-10-19 RX ORDER — GABAPENTIN 300 MG/1
600 CAPSULE ORAL
Status: COMPLETED | OUTPATIENT
Start: 2023-10-19 | End: 2023-10-19

## 2023-10-19 RX ORDER — HYDROCODONE BITARTRATE AND ACETAMINOPHEN 7.5; 325 MG/1; MG/1
1 TABLET ORAL EVERY 6 HOURS PRN
Status: DISCONTINUED | OUTPATIENT
Start: 2023-10-19 | End: 2023-10-19 | Stop reason: HOSPADM

## 2023-10-19 RX ORDER — BUPIVACAINE HYDROCHLORIDE 2.5 MG/ML
INJECTION, SOLUTION EPIDURAL; INFILTRATION; INTRACAUDAL
Status: DISCONTINUED | OUTPATIENT
Start: 2023-10-19 | End: 2023-10-19 | Stop reason: HOSPADM

## 2023-10-19 RX ORDER — ONDANSETRON 2 MG/ML
4 INJECTION INTRAMUSCULAR; INTRAVENOUS
Status: COMPLETED | OUTPATIENT
Start: 2023-10-19 | End: 2023-10-19

## 2023-10-19 RX ORDER — CLINDAMYCIN PHOSPHATE 600 MG/50ML
INJECTION, SOLUTION INTRAVENOUS
Status: COMPLETED
Start: 2023-10-19 | End: 2023-10-19

## 2023-10-19 RX ORDER — CLINDAMYCIN PHOSPHATE 600 MG/50ML
600 INJECTION, SOLUTION INTRAVENOUS
Status: DISCONTINUED | OUTPATIENT
Start: 2023-10-19 | End: 2023-10-19 | Stop reason: HOSPADM

## 2023-10-19 RX ORDER — SODIUM CHLORIDE, SODIUM LACTATE, POTASSIUM CHLORIDE, CALCIUM CHLORIDE 600; 310; 30; 20 MG/100ML; MG/100ML; MG/100ML; MG/100ML
INJECTION, SOLUTION INTRAVENOUS CONTINUOUS
Status: ACTIVE | OUTPATIENT
Start: 2023-10-19

## 2023-10-19 RX ORDER — CELECOXIB 200 MG/1
200 CAPSULE ORAL
Status: COMPLETED | OUTPATIENT
Start: 2023-10-19 | End: 2023-10-19

## 2023-10-19 RX ORDER — LIDOCAINE HYDROCHLORIDE 10 MG/ML
1 INJECTION, SOLUTION EPIDURAL; INFILTRATION; INTRACAUDAL; PERINEURAL ONCE
Status: ACTIVE | OUTPATIENT
Start: 2023-10-19

## 2023-10-19 RX ORDER — METHOCARBAMOL 100 MG/ML
500 INJECTION, SOLUTION INTRAMUSCULAR; INTRAVENOUS ONCE
Status: COMPLETED | OUTPATIENT
Start: 2023-10-19 | End: 2023-10-19

## 2023-10-19 RX ORDER — ONDANSETRON 2 MG/ML
4 INJECTION INTRAMUSCULAR; INTRAVENOUS ONCE AS NEEDED
Status: ACTIVE | OUTPATIENT
Start: 2023-10-19 | End: 2035-03-17

## 2023-10-19 RX ORDER — MEPERIDINE HYDROCHLORIDE 25 MG/ML
12.5 INJECTION INTRAMUSCULAR; INTRAVENOUS; SUBCUTANEOUS ONCE
Status: COMPLETED | OUTPATIENT
Start: 2023-10-19 | End: 2023-10-19

## 2023-10-19 RX ORDER — DEXAMETHASONE SODIUM PHOSPHATE 4 MG/ML
INJECTION, SOLUTION INTRA-ARTICULAR; INTRALESIONAL; INTRAMUSCULAR; INTRAVENOUS; SOFT TISSUE
Status: DISCONTINUED | OUTPATIENT
Start: 2023-10-19 | End: 2023-10-19

## 2023-10-19 RX ORDER — PROPOFOL 10 MG/ML
VIAL (ML) INTRAVENOUS
Status: DISCONTINUED | OUTPATIENT
Start: 2023-10-19 | End: 2023-10-19

## 2023-10-19 RX ORDER — HYDROCODONE BITARTRATE AND ACETAMINOPHEN 7.5; 325 MG/1; MG/1
1 TABLET ORAL EVERY 6 HOURS PRN
Qty: 20 TABLET | Refills: 0 | Status: SHIPPED | OUTPATIENT
Start: 2023-10-19 | End: 2023-11-08

## 2023-10-19 RX ORDER — ACETAMINOPHEN 500 MG
1000 TABLET ORAL
Status: COMPLETED | OUTPATIENT
Start: 2023-10-19 | End: 2023-10-19

## 2023-10-19 RX ORDER — BUPIVACAINE HYDROCHLORIDE 2.5 MG/ML
INJECTION, SOLUTION EPIDURAL; INFILTRATION; INTRACAUDAL
Status: DISCONTINUED
Start: 2023-10-19 | End: 2023-10-19 | Stop reason: HOSPADM

## 2023-10-19 RX ADMIN — HYDROMORPHONE HYDROCHLORIDE 0.4 MG: 2 INJECTION INTRAMUSCULAR; INTRAVENOUS; SUBCUTANEOUS at 05:10

## 2023-10-19 RX ADMIN — FENTANYL CITRATE 50 MCG: 50 INJECTION, SOLUTION INTRAMUSCULAR; INTRAVENOUS at 04:10

## 2023-10-19 RX ADMIN — MIDAZOLAM HYDROCHLORIDE 2 MG: 1 INJECTION, SOLUTION INTRAMUSCULAR; INTRAVENOUS at 03:10

## 2023-10-19 RX ADMIN — METHOCARBAMOL 500 MG: 100 INJECTION INTRAMUSCULAR; INTRAVENOUS at 05:10

## 2023-10-19 RX ADMIN — DEXAMETHASONE SODIUM PHOSPHATE 4 MG: 4 INJECTION, SOLUTION INTRA-ARTICULAR; INTRALESIONAL; INTRAMUSCULAR; INTRAVENOUS; SOFT TISSUE at 03:10

## 2023-10-19 RX ADMIN — HYDROCODONE BITARTRATE AND ACETAMINOPHEN 1 TABLET: 7.5; 325 TABLET ORAL at 07:10

## 2023-10-19 RX ADMIN — CELECOXIB 200 MG: 200 CAPSULE ORAL at 02:10

## 2023-10-19 RX ADMIN — ACETAMINOPHEN 1000 MG: 500 TABLET ORAL at 02:10

## 2023-10-19 RX ADMIN — PROPOFOL 150 MG: 10 INJECTION, EMULSION INTRAVENOUS at 03:10

## 2023-10-19 RX ADMIN — MEPERIDINE HYDROCHLORIDE 12.5 MG: 25 INJECTION INTRAMUSCULAR; INTRAVENOUS; SUBCUTANEOUS at 05:10

## 2023-10-19 RX ADMIN — FENTANYL CITRATE 50 MCG: 50 INJECTION, SOLUTION INTRAMUSCULAR; INTRAVENOUS at 03:10

## 2023-10-19 RX ADMIN — SODIUM CHLORIDE, POTASSIUM CHLORIDE, SODIUM LACTATE AND CALCIUM CHLORIDE: 600; 310; 30; 20 INJECTION, SOLUTION INTRAVENOUS at 05:10

## 2023-10-19 RX ADMIN — LIDOCAINE HYDROCHLORIDE 50 MG: 10 INJECTION, SOLUTION EPIDURAL; INFILTRATION; INTRACAUDAL; PERINEURAL at 03:10

## 2023-10-19 RX ADMIN — SODIUM CHLORIDE, POTASSIUM CHLORIDE, SODIUM LACTATE AND CALCIUM CHLORIDE: 600; 310; 30; 20 INJECTION, SOLUTION INTRAVENOUS at 03:10

## 2023-10-19 RX ADMIN — CLINDAMYCIN IN 5 PERCENT DEXTROSE 600 MG: 12 INJECTION, SOLUTION INTRAVENOUS at 03:10

## 2023-10-19 RX ADMIN — GABAPENTIN 600 MG: 300 CAPSULE ORAL at 02:10

## 2023-10-19 RX ADMIN — SODIUM CHLORIDE, POTASSIUM CHLORIDE, SODIUM LACTATE AND CALCIUM CHLORIDE: 600; 310; 30; 20 INJECTION, SOLUTION INTRAVENOUS at 02:10

## 2023-10-19 RX ADMIN — ONDANSETRON 4 MG: 2 INJECTION INTRAMUSCULAR; INTRAVENOUS at 02:10

## 2023-10-19 NOTE — CARE UPDATE
Received patient from the OR, he is arousing and c/o pain upon pacu arrival,capps, oriented/reassured him, respirations full-regular-deep-clear,hob up 30 degrees.

## 2023-10-19 NOTE — OP NOTE
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  He was given a general anesthetic in supine position his abdomen and genitals were prepped and draped sterilely.  Using a 15. Blade.  I made a horizontal over the right internal inguinal ring divided the abdominal layers down to the external oblique aponeurosis.  I used a 15 blade to score the aponeurotic sheath and I extended the incision through the external inguinal ring.  I delivered the testicle up through the inguinal incision and I divided the cremasteric muscles from the cord and mobilized the cord all the way to the internal ring.  I opened up the tunica vaginalis and placed a 3-0 silk stitch on the inferior aspect of the testicle.  I then made a horizontal right hemiscrotal incision in the dependent portion of the scrotum divided through the dartos fascia.  I used the silk stitch to bring the testicle down through the inguinal canal into the dependent portion of the scrotum.  Using 3-0 silk sutures I tacked the testicle to the dartos fascia medially laterally and inferiorly I closed the dartos fascia with a running 3-0 Vicryl suture followed by 4-0 Monocryl subcuticular stitch for the skin.  I closed the external oblique aponeurosis with a running 3-0 Vicryl suture followed by Oswaldo's fascia with a running 3-0 Vicryl suture followed by the skin with a 4-0 Monocryl subcuticular stitch.  I placed Dermabond glue over both incisions.  He tolerated the procedure well there were no apparent complications he was extubated and brought to  recovery in good condition

## 2023-10-19 NOTE — CARE UPDATE
Dr. Ramsey present and updated on his status, v/s, med given and c/o of pain.  See order for robaxin and see mar for time/dosage given.

## 2023-10-19 NOTE — DISCHARGE SUMMARY
[facility] Surgical - Periop Services  Discharge Note  Short Stay    Procedure(s) (LRB):  ORCHIOPEXY  right (Right)      OUTCOME: Patient tolerated treatment/procedure well without complication and is now ready for discharge.    DISPOSITION: Home or Self Care    FINAL DIAGNOSIS:  Retractile testis    FOLLOWUP: In clinic    DISCHARGE INSTRUCTIONS:  No discharge procedures on file.     TIME SPENT ON DISCHARGE: 10 minutes

## 2023-10-19 NOTE — CARE UPDATE
Dr. Ramsey rounding and updated on his status, v/s, meds given.  See order for demerol and see mar for time/dosage given.

## 2023-10-19 NOTE — TRANSFER OF CARE
"Anesthesia Transfer of Care Note    Patient: Rich Nicholas    Procedure(s) Performed: Procedure(s) (LRB):  ORCHIOPEXY  right (Right)    Patient location: PACU    Anesthesia Type: general    Transport from OR: Transported from OR on room air with adequate spontaneous ventilation    Post pain: adequate analgesia    Post assessment: no apparent anesthetic complications    Post vital signs: stable    Level of consciousness: responds to stimulation    Nausea/Vomiting: no nausea/vomiting    Complications: none    Transfer of care protocol was followed      Last vitals:   Visit Vitals  /86   Pulse 60   Temp 36.7 °C (98.1 °F) (Oral)   Resp 18   Ht 6' 2" (1.88 m)   Wt 78.2 kg (172 lb 6.4 oz)   SpO2 99%   BMI 22.13 kg/m²     "

## 2023-10-19 NOTE — ANESTHESIA POSTPROCEDURE EVALUATION
Anesthesia Post Evaluation    Patient: Rich Nicholas    Procedure(s) Performed: Procedure(s) (LRB):  ORCHIOPEXY  right (Right)    Final Anesthesia Type: general      Patient location during evaluation: PACU  Patient participation: Yes- Able to Participate  Level of consciousness: awake and alert and oriented  Post-procedure vital signs: reviewed and stable  Pain management: adequate  Airway patency: patent    PONV status at discharge: No PONV  Anesthetic complications: no      Cardiovascular status: hemodynamically stable  Respiratory status: unassisted  Hydration status: euvolemic  Follow-up not needed.          Vitals Value Taken Time   /76 10/19/23 1806   Temp 36.4 °C (97.5 °F) 10/19/23 1705   Pulse 65 10/19/23 1807   Resp 19 10/19/23 1800   SpO2 97 % 10/19/23 1807   Vitals shown include unvalidated device data.      No case tracking events are documented in the log.      Pain/Alexia Score: Pain Rating Prior to Med Admin: 8 (10/19/2023  5:56 PM)  Pain Rating Post Med Admin: 3 (10/19/2023  5:56 PM)  Alexia Score: 10 (10/19/2023  5:40 PM)

## 2023-10-19 NOTE — CARE UPDATE
Note entered at 1325 meant for 1725 - Dr. Ramsey rounding and updated on his status, v/s, med given - see order for robaxin and see mar for time/dosage given.

## 2023-10-19 NOTE — ANESTHESIA PROCEDURE NOTES
Intubation    Date/Time: 10/19/2023 3:54 PM    Performed by: Lasha Oquendo CRNA  Authorized by: Eric Willard MD    Intubation:     Induction:  Intravenous    Intubated:  Postinduction    Mask Ventilation:  Easy mask    Attempts:  1    Attempted By:  CRNA    Difficult Airway Encountered?: No      Complications:  None    Airway Device:  Supraglottic airway/LMA    Airway Device Size:  5.0    Style/Cuff Inflation:  Cuffed (inflated to minimal occlusive pressure)    Secured at:  The lips    Placement Verified By:  Capnometry    Findings Post-Intubation:  Atraumatic/condition of teeth unchanged

## 2023-10-22 VITALS
WEIGHT: 172.38 LBS | BODY MASS INDEX: 22.12 KG/M2 | SYSTOLIC BLOOD PRESSURE: 129 MMHG | HEART RATE: 65 BPM | RESPIRATION RATE: 18 BRPM | HEIGHT: 74 IN | DIASTOLIC BLOOD PRESSURE: 74 MMHG | TEMPERATURE: 98 F | OXYGEN SATURATION: 98 %

## 2023-11-06 PROBLEM — Z00.00 ANNUAL PHYSICAL EXAM: Status: RESOLVED | Noted: 2022-07-07 | Resolved: 2023-11-06

## 2024-02-07 PROBLEM — F98.8 ATTENTION DEFICIT DISORDER: Chronic | Status: ACTIVE | Noted: 2022-05-25

## (undated) DEVICE — SOL NACL IRR 1000ML BTL

## (undated) DEVICE — SUPPORT ULNA NERVE PROTECTOR

## (undated) DEVICE — Device

## (undated) DEVICE — ELECTRODE PATIENT RETURN DISP

## (undated) DEVICE — SUT VICRYL 3-0 27 SH

## (undated) DEVICE — SUT MCRYL PLUS 4-0 PS2 27IN

## (undated) DEVICE — GAUZE SPONGE 4X4 12PLY

## (undated) DEVICE — NDL HYPO REG 25G X 1 1/2

## (undated) DEVICE — SUT SILK 3-0 BLK BR SH 30IN

## (undated) DEVICE — ADHESIVE DERMABOND ADVANCED

## (undated) DEVICE — GLOVE SENSICARE PI SURG 6.5

## (undated) DEVICE — NDL SYR 10ML 18X1.5 LL BLUNT

## (undated) DEVICE — GLOVE PROTEXIS LTX MICRO  7.5